# Patient Record
Sex: FEMALE | Race: WHITE | NOT HISPANIC OR LATINO | Employment: UNEMPLOYED | URBAN - METROPOLITAN AREA
[De-identification: names, ages, dates, MRNs, and addresses within clinical notes are randomized per-mention and may not be internally consistent; named-entity substitution may affect disease eponyms.]

---

## 2018-08-07 ENCOUNTER — HOSPITAL ENCOUNTER (OUTPATIENT)
Dept: GENERAL RADIOLOGY | Facility: HOSPITAL | Age: 63
Discharge: HOME | End: 2018-08-07

## 2018-08-07 ENCOUNTER — HOSPITAL ENCOUNTER (OUTPATIENT)
Dept: ULTRASOUND IMAGING | Facility: HOSPITAL | Age: 63
Discharge: HOME | End: 2018-08-07
Admitting: PHYSICIAN ASSISTANT

## 2018-08-07 ENCOUNTER — TRANSCRIBE ORDERS (OUTPATIENT)
Dept: ADMINISTRATIVE | Facility: HOSPITAL | Age: 63
End: 2018-08-07

## 2018-08-07 DIAGNOSIS — R10.31 RIGHT GROIN PAIN: ICD-10-CM

## 2018-08-07 DIAGNOSIS — M79.604 BILATERAL LEG PAIN: ICD-10-CM

## 2018-08-07 DIAGNOSIS — M79.605 BILATERAL LEG PAIN: Primary | ICD-10-CM

## 2018-08-07 DIAGNOSIS — M79.605 BILATERAL LEG PAIN: ICD-10-CM

## 2018-08-07 DIAGNOSIS — M79.604 BILATERAL LEG PAIN: Primary | ICD-10-CM

## 2018-08-07 PROCEDURE — 93970 EXTREMITY STUDY: CPT

## 2018-08-07 PROCEDURE — 73502 X-RAY EXAM HIP UNI 2-3 VIEWS: CPT

## 2018-08-08 ENCOUNTER — TRANSCRIBE ORDERS (OUTPATIENT)
Dept: ADMINISTRATIVE | Facility: HOSPITAL | Age: 63
End: 2018-08-08

## 2018-08-08 DIAGNOSIS — R20.2 PARESTHESIA OF RIGHT LOWER EXTREMITY: ICD-10-CM

## 2018-08-08 DIAGNOSIS — M54.2 CERVICAL PAIN: Primary | ICD-10-CM

## 2018-08-22 ENCOUNTER — APPOINTMENT (OUTPATIENT)
Dept: INFUSION THERAPY | Facility: HOSPITAL | Age: 63
End: 2018-08-22
Attending: PSYCHIATRY & NEUROLOGY

## 2018-08-29 ENCOUNTER — HOSPITAL ENCOUNTER (OUTPATIENT)
Dept: INFUSION THERAPY | Facility: HOSPITAL | Age: 63
End: 2018-08-29
Attending: PSYCHIATRY & NEUROLOGY

## 2018-09-06 ENCOUNTER — HOSPITAL ENCOUNTER (OUTPATIENT)
Dept: INFUSION THERAPY | Facility: HOSPITAL | Age: 63
Discharge: HOME | End: 2018-09-06
Attending: PSYCHIATRY & NEUROLOGY | Admitting: PSYCHIATRY & NEUROLOGY

## 2018-09-06 DIAGNOSIS — M54.2 CERVICAL PAIN: ICD-10-CM

## 2018-09-06 DIAGNOSIS — R20.2 PARESTHESIA OF RIGHT LOWER EXTREMITY: ICD-10-CM

## 2018-09-06 PROCEDURE — 95886 MUSC TEST DONE W/N TEST COMP: CPT

## 2018-09-06 PROCEDURE — 95909 NRV CNDJ TST 5-6 STUDIES: CPT

## 2018-09-06 PROCEDURE — 95909 NRV CNDJ TST 5-6 STUDIES: CPT | Performed by: PSYCHIATRY & NEUROLOGY

## 2018-09-06 PROCEDURE — 95886 MUSC TEST DONE W/N TEST COMP: CPT | Performed by: PSYCHIATRY & NEUROLOGY

## 2018-09-06 NOTE — PROCEDURES
EMG and Nerve Conduction Studies    Please see data sheets for details on methods, temperatures and lab standards. EMG muscles tested for upper extremity studies include the deltoid, biceps, triceps, pronator teres, extensor digitorum communis, first dorsal interosseous and abductor pollicis brevis.  EMG muscles tested for lower extremity studies include the vastus lateralis, tibialis anterior, peroneus longus, medial gastrocnemius and extensor digitorum brevis.  Additional muscles tested as needed.  Paraspinal muscles tested as needed.  The complete report includes the data sheets.    Indication: Low back pain and pain radiating from the right groin down the entire right leg with numbness tingling and burning including all toes.  History: 62-year-old female diabetic with four-month history of back pain and right groin pain radiating down the right leg with burning in sensation which includes all toes of that foot.  Denies symptoms on the left.      Ht: Not reported  Wt: Not reported  HbA1C: No results found for: HGBA1C  TSH: No results found for: TSH    Technical summary:  Nerve conduction studies were obtained in the right leg with one comparison on the left.  Skin temperatures were 31-31.5°C but temperature correction was not needed since the distal latencies were normal.  Needle examination was obtained on selected muscles of the right leg.    Results:  1.  Normal right sural sensory study.  2.  Normal right superficial peroneal sensory study.  3.  Slow right peroneal motor velocity below the knee at 36.7 m/s with normal velocity in the short segment across the fibular head.  Normal distal latency and amplitudes.  4.  Slow tibial motor velocities bilaterally at 35.9 meters per second on the right and 38.8 m/s on the left.  The distal latency and amplitudes were normal on the right.  The distal latency was normal on the left with low amplitude of 1.5 mV.  5.  Needle examination of selected muscles of the right  leg showed occasional positive sharp waves in the extensor digitorum brevis.  There were normal-appearing motor units and recruitment.  All other muscles tested in the right leg showed normal insertional activities, motor units and recruitment.  Lumbar paraspinals at L5 showed occasional positive sharp waves.    Impression:  Abnormal study showing mild peripheral neuropathy.  In addition there is suspicion of a right L5 or S1 radiculopathy.  The evidence for this however is minor.  Clinical correlation is suggested.  Study results were discussed with the patient.    Rom Miranda M.D.              Dictated utilizing Dragon dictation.

## 2019-05-02 ENCOUNTER — APPOINTMENT (OUTPATIENT)
Dept: PHYSICAL THERAPY | Facility: HOSPITAL | Age: 64
End: 2019-05-02

## 2019-05-14 ENCOUNTER — HOSPITAL ENCOUNTER (OUTPATIENT)
Dept: PHYSICAL THERAPY | Facility: HOSPITAL | Age: 64
Setting detail: THERAPIES SERIES
Discharge: HOME | End: 2019-05-14

## 2019-05-14 DIAGNOSIS — M54.41 LOW BACK PAIN WITH RIGHT-SIDED SCIATICA, UNSPECIFIED BACK PAIN LATERALITY, UNSPECIFIED CHRONICITY: Primary | ICD-10-CM

## 2019-05-14 PROCEDURE — 97162 PT EVAL MOD COMPLEX 30 MIN: CPT | Performed by: PHYSICAL THERAPIST

## 2019-05-14 NOTE — THERAPY EVALUATION
Outpatient Physical Therapy Ortho Initial Evaluation  [facility]     Patient Name: Millie Calderon  : 1955  MRN: [id number]  Today's Date: 2019      Visit Date: 2019    There is no problem list on file for this patient.       Past Medical History:   Diagnosis Date   • Abnormal electrocardiogram    • Back pain    • Backache    • Complete atrioventricular block (CMS/HCC)    • Depression    • Diabetes mellitus (CMS/HCC)    • Disc degeneration, lumbar    • Encounter for eye exam    • Esophageal reflux    • H/O bone density study    • H/O screening mammography    • Hyperlipidemia    • Hypertension    • Palpitations    • Second degree AV block, Mobitz type I     Second degree AV block, Mobitz type I with 2:1 AV response   • Vitamin D deficiency         Past Surgical History:   Procedure Laterality Date   • BACK SURGERY     • CARDIAC PACEMAKER PLACEMENT     • COLONOSCOPY W/ BIOPSIES AND POLYPECTOMY     • HAND TENDON SURGERY      Hand incision tendon sheath of a finger   • HYSTERECTOMY     • PAP SMEAR      Pap smear for cervical cancer screening   • UPPER GASTROINTESTINAL ENDOSCOPY      Diagnostic       Visit Dx:     ICD-10-CM ICD-9-CM   1. Low back pain with right-sided sciatica, unspecified back pain laterality, unspecified chronicity M54.41 724.3         Patient History     Row Name 19 1300             History    Chief Complaint  Pain;Difficulty with daily activities  -SP      Type of Pain  Lower Extremity / Leg;Back pain  -SP      Date Current Problem(s) Began  19 maybe 3 years ago  -SP      Brief Description of Current Complaint  Patient states that she has had insidious pain in low back for approximately 3 years.  She reports approximately 2 years ago she began having numbness and burning into right LE.  She reports that she has not been able to tell if she is going to the bathroom and will have bowel movements she cannot control.  Patient states she did tell her MD this and she is  "to see a spine specialist on 6-.  Patient reports that she has had injections into her spine at pain management approximately 5-6 months ago with no significant pain relief.   She states she is taking meds for her back that does.  Patient reports that she has weakness in her right LE and it does give out on her.  She states she occasionally uses a walker or a cane.  Patient states that she thinks patient may need surgery and she wants her to come to therapy to get stronger prior to surgery if it is needed.  -SP      Previous treatment for THIS PROBLEM  Medication;Injections  -SP      Patient/Caregiver Goals  Relieve pain;Improve mobility;Improve strength  -SP      Current Tobacco Use  former  -SP      Patient's Rating of General Health  Fair  -SP      Hand Dominance  right-handed  -SP      Patient seeing anyone else for problem(s)?  \"none\"  -SP      How has patient tried to help current problem?  heat  -SP         Pain     Pain Location  Leg;Back  -SP      Pain at Present  6  -SP      Pain at Best  6  -SP      Pain at Worst  9  -SP      Pain Frequency  Constant/continuous  -SP      Pain Description  Numbness;Burning  -SP      What Performance Factors Make the Current Problem(s) WORSE?  walking  -SP      What Performance Factors Make the Current Problem(s) BETTER?  right sidelying  -SP      Tolerance Time- Standing  5-10 min  -SP      Tolerance Time- Sitting  can sit but has constantly shift weight  -SP      Tolerance Time- Walking  5-10 min  -SP      Is your sleep disturbed?  Yes  -SP      What position do you sleep in?  Right sidelying  -SP      Difficulties with ADL's?  difficulty tolerating weight bearing, limited tolerance for ADLs, has to take frequent breaks, has to go up flights of stairs and has difficulty  -SP         Fall Risk Assessment    Any falls in the past year:  Yes multiple at least 2/week  -SP      Number of falls reported in the last 12 months  -- heidi; at least 2/week  -SP      " Factors that contributed to the fall:  -- right leg gives out  -SP         Daily Activities    Primary Language  English  -SP      Are you able to read  Yes  -SP      Are you able to write  Yes  -SP      How does patient learn best?  Listening;Reading  -SP      Teaching needs identified  Home Exercise Program;Management of Condition  -SP      Patient is concerned about/has problems with  Standing;Transfers (getting out of a chair, bed);Walking  -SP      Does patient have problems with the following?  Depression;Anxiety;Panic Attack  -SP      Pt Participated in POC and Goals  Yes  -SP         Safety    Are you being hurt, hit, or frightened by anyone at home or in your life?  No  -SP      Are you being neglected by a caregiver  No  -SP        User Key  (r) = Recorded By, (t) = Taken By, (c) = Cosigned By    Initials Name Provider Type    Blanca Easley, PT Physical Therapist          PT Ortho     Row Name 05/14/19 4815       Subjective Comments    Subjective Comments  Patient arrives 30 min late for scheuduled appointment.  Patient requiring bathroom break during treatment.  Limited by time constraints  -SP       Precautions and Contraindications    Precautions  Patient reports approximate 1 month onset bowel issues that she reports she has discussed with Emilee Tom PAC  -SP    Contraindications  pacemaker  -SP       Subjective Pain    Pre-Treatment Pain Level  6  -SP    Post-Treatment Pain Level  8  -SP       Posture/Observations    Forward Head  Mild  -SP    Cervical Lordosis  Decreased  -SP    Rounded Shoulders  Bilateral:;Mild  -SP    Lumbar lordosis  Decreased  -SP    Iliac crests  Bilateral:;Normal  -SP    Genu valgus  Bilateral:;Mild  -SP    Posture/Observations Comments  avoids full weight bearing on right LE.  Patient unable to tolerate supine or reclined position for testing/evaluation or treatment  -SP       Neural Tension Signs- Lower Quarter Clearing    SLR  Bilateral:;Negative  -SP        Sensory Screen for Light Touch- Lower Quarter Clearing    L1 (inguinal area)  Bilateral:;Intact  -SP    L2 (anterior mid thigh)  Right:;Diminished  -SP    L3 (distal anterior thigh)  Right:;Diminished  -SP    L4 (medial lower leg/foot)  Right:;Diminished  -SP    L5 (lateral lower leg/great toe)  Bilateral:;Intact  -SP    S1 (bottom of foot)  Bilateral:;Intact  -SP       Myotomal Screen- Lower Quarter Clearing    Hip flexion (L2)  Right:;3- (Fair -);Left:;4- (Good -)  -SP    Knee extension (L3)  Right:;4- (Good -);Left:;4 (Good)  -SP    Ankle DF (L4)  Right:;4- (Good -);Left:;4 (Good)  -SP    Great toe extension (L5)  Right:;4- (Good -);Left:;4 (Good)  -SP    Ankle PF (S1)  Right:;3- (Fair -);Left:;3 (Fair)  -SP    Knee flexion (S2)  Right:;3+ (Fair +);Left:;4 (Good)  -SP       Lumbar/SI Special Tests    SLR (Neural Tension)  Bilateral:;Negative  -SP    Lumbar/SI Special Tests Comments  Diffiuclty assessing due to intolerance for positioning and movement  -SP       Lumbosacral Palpation    SI  Bilateral:;Tender  -SP    Piriformis  Right:;Tender;Guarded/taut  -SP    Erector Spinae (Paraspinals)  Bilateral:;Tender;Guarded/taut  -SP    Hamstring  Bilateral:;Tender;Guarded/taut  -SP       Head/Neck/Trunk    Trunk Extension AROM  unable to extend  -SP    Trunk Flexion AROM  minimal, <20 degrees with complaints of low back and right buttocks area pain  -SP    Trunk Lt Lateral Flexion AROM  decreased by 75% from full range with pain   -SP    Trunk Rt Lateral Flexion AROM  decreased by 75% from full range with pain   -SP    Trunk Lt Rotation AROM  decreased by 75% from full range with right L/S area pain  -SP    Trunk Rt Rotation AROM  decreased by 75% from full range with pain right L/S area  -SP       MMT Neck/Trunk    Trunk Flexion MMT, Gross Movement  (2/5) poor  -SP    Left Pelvic Elevation MMT, Gross Movement  (2-/5) poor minus  -SP    Right Pelvic Elevation MMT, Gross Movement:  (2-/5) poor minus  -SP       Lower  Extremity Flexibility    Hamstrings  Bilateral:;Moderately limited  -SP    Hip Flexors  Bilateral:;Moderately limited  -SP    Hip External Rotators  Bilateral:;Moderately limited  -SP    Hip Internal Rotators  Bilateral:;Moderately limited  -SP       Balance Skills Training    SLS  unable to SLS without UE support  -SP       Transfers    Sit-Stand [place] (Transfers)  independent  -SP    Stand-Sit [place] (Transfers)  independent  -SP    Comment (Transfers)  patient uses UEs to assist with transfers sit to stand.  Uses appropriate technique with sit to supine, but needs cues to transfer sup to sit and assist of therapist to transfer.   Patient only tolerates supine positioning briefly.  -SP       Gait/Stairs Assessment/Training    [place] Level (Gait)  independent  -SP    Assistive Device (Gait)  other (see comments) presents w/o AD: states she uses walker/cane intermittently  -SP    Pattern (Gait)  swing-through  -SP    Deviations/Abnormal Patterns (Gait)  antalgic;gait speed decreased;stride length decreased  -SP    Bilateral Gait Deviations  forward flexed posture;heel strike decreased  -SP    Ascending Technique (Stairs)  step-to-step  -SP    Descending Technique (Stairs)  step-to-step  -SP    Comment (Gait/Stairs)  requires hold to rail  -SP      User Key  (r) = Recorded By, (t) = Taken By, (c) = Cosigned By    Initials Name Provider Type    Blanca Easley, PT Physical Therapist                      Therapy Education  Given: Posture/body mechanics  Program: New  How Provided: Verbal  Provided to: Patient  Level of Understanding: Verbalized     PT OP Goals     Row Name 05/14/19 9150          PT Short Term Goals    STG Date to Achieve  05/29/19  -SP     STG 1  Patient transfers sup/sit using appropriate technique independently and without increased symptoms  -SP     STG 2  Patient transfers sit to stand with decreased use of UEs to assist  -SP     STG 3  Patient reports decreased  radicular symptoms by 25%  -SP        Long Term Goals    LTG 1  Patient demonstrates increased trunk strength by one muscle grade to better tolerate ADLs  -SP     LTG 2  Patient able to ambulate in community >30 minutes with pain level <2/10 at worst  -SP     LTG 3  Patient independent with HEP  -SP        Time Calculation    PT Goal Re-Cert Due Date  06/13/19  -SP       User Key  (r) = Recorded By, (t) = Taken By, (c) = Cosigned By    Initials Name Provider Type    SP Blanca Burnett, PT Physical Therapist          PT Assessment/Plan     Row Name 05/14/19 1943          PT Assessment    Functional Limitations  Impaired gait;Limitation in home management;Limitations in community activities;Performance in self-care ADL;Limitations in functional capacity and performance  -SP     Impairments  Balance;Gait;Impaired flexibility;Muscle strength;Pain;Posture;Poor body mechanics;Range of motion;Endurance  -SP     Assessment Comments  Patient presents with chronic history of low back pain with right LE radicular type symptoms.  Patient also is reporting bowel changes over the last month and states she has informed her referral source of this and is scheduled to see a spine specialist.  Patient presents with pain, decreased trunk mobility, decreased strength, impaired functional mobility, and decreased tolerance for weight bearing ADLs  -SP     Please refer to paper survey for additional self-reported information  Yes  -SP     Rehab Potential  Fair  -SP     Patient/caregiver participated in establishment of treatment plan and goals  Yes  -SP     Patient would benefit from skilled therapy intervention  Yes  -SP        PT Plan    PT Frequency  2x/week  -SP     Predicted Duration of Therapy Intervention (Therapy Eval)  4-6 weeks  -SP     Planned CPT's?  PT EVAL LOW COMPLEXITY: 42770;PT MANUAL THERAPY EA 15 MIN: 09146;PT HOT OR COLD PACK TREAT [facility];PT THER PROC EA 15 MIN: 99548;PT GAIT TRAINING EA 15 MIN: 51434  -SP        User Key  (r) = Recorded By, (t) = Taken By, (c) = Cosigned By    Initials Name Provider Type    Blanca Easley, PT Physical Therapist          Modalities     Row Name 05/14/19 1900             Moist Heat    MH Applied  Yes patient prone  -SP      Location  L/S area  -SP      Rx Minutes  10 mins  -SP        User Key  (r) = Recorded By, (t) = Taken By, (c) = Cosigned By    Initials Name Provider Type    Blanca Easley, PT Physical Therapist        Exercises     Row Name 05/14/19 1900             Subjective Comments    Subjective Comments  Patient arrives 30 min late for scheuduled appointment.  Patient requiring bathroom break during treatment.  Limited by time constraints  -SP         Subjective Pain    Pre-Treatment Pain Level  6  -SP      Post-Treatment Pain Level  8  -SP         Exercise 1    Exercise Name 1  LTR  -SP      Reps 1  5  -SP         Exercise 2    Additional Comments  attempted SKTC, pelvic tilt, piriformis stretch, however patient unable to tolerate  -SP        User Key  (r) = Recorded By, (t) = Taken By, (c) = Cosigned By    Initials Name Provider Type    Blanca Easley, PT Physical Therapist                        Outcome Measure Options: Other Outcome Measure  Other Outcome Measure Tool Used  Other Outcome Measure Tool Comments: Back index score 76      Time Calculation:     Start Time: 1315  Stop Time: 1400  Time Calculation (min): 45 min     Therapy Charges for Today     Code Description Service Date Service Provider Modifiers Qty    [id number]  PT EVAL MOD COMPLEXITY 2 5/14/2019 Blanca Burnett, PT GP 1          PT G-Codes  Outcome Measure Options: Other Outcome Measure         Blanca Burnett, [email or web address]  5/14/2019

## 2019-05-22 ENCOUNTER — APPOINTMENT (OUTPATIENT)
Dept: PHYSICAL THERAPY | Facility: HOSPITAL | Age: 64
End: 2019-05-22

## 2019-09-09 ENCOUNTER — TRANSCRIBE ORDERS (OUTPATIENT)
Dept: ADMINISTRATIVE | Facility: HOSPITAL | Age: 64
End: 2019-09-09

## 2019-09-09 DIAGNOSIS — M54.10 RADICULAR SYNDROME OF LOWER LIMBS: Primary | ICD-10-CM

## 2019-09-09 DIAGNOSIS — M54.10 RADICULAR PAIN: ICD-10-CM

## 2019-09-13 ENCOUNTER — APPOINTMENT (OUTPATIENT)
Dept: CT IMAGING | Facility: HOSPITAL | Age: 64
End: 2019-09-13

## 2019-10-10 ENCOUNTER — HOSPITAL ENCOUNTER (OUTPATIENT)
Dept: CT IMAGING | Facility: HOSPITAL | Age: 64
Discharge: HOME | End: 2019-10-10
Admitting: PHYSICIAN ASSISTANT

## 2019-10-10 DIAGNOSIS — M54.10 RADICULAR PAIN: ICD-10-CM

## 2019-10-10 DIAGNOSIS — M54.10 RADICULAR SYNDROME OF LOWER LIMBS: ICD-10-CM

## 2019-10-10 PROCEDURE — 72131 CT LUMBAR SPINE W/O DYE: CPT
